# Patient Record
Sex: MALE | Race: WHITE | Employment: FULL TIME | URBAN - METROPOLITAN AREA
[De-identification: names, ages, dates, MRNs, and addresses within clinical notes are randomized per-mention and may not be internally consistent; named-entity substitution may affect disease eponyms.]

---

## 2022-10-29 PROCEDURE — 99283 EMERGENCY DEPT VISIT LOW MDM: CPT

## 2022-10-30 ENCOUNTER — HOSPITAL ENCOUNTER (EMERGENCY)
Age: 47
Discharge: HOME | End: 2022-10-30
Attending: EMERGENCY MEDICINE
Payer: COMMERCIAL

## 2022-10-30 VITALS
OXYGEN SATURATION: 99 % | BODY MASS INDEX: 31.14 KG/M2 | TEMPERATURE: 98 F | RESPIRATION RATE: 18 BRPM | DIASTOLIC BLOOD PRESSURE: 102 MMHG | WEIGHT: 235 LBS | SYSTOLIC BLOOD PRESSURE: 147 MMHG | HEIGHT: 73 IN | HEART RATE: 120 BPM

## 2022-10-30 DIAGNOSIS — K08.89 PAIN, DENTAL: Primary | ICD-10-CM

## 2022-10-30 RX ORDER — HYDROCODONE BITARTRATE AND ACETAMINOPHEN 5; 325 MG/1; MG/1
1 TABLET ORAL
Qty: 12 TABLET | Refills: 0 | Status: SHIPPED | OUTPATIENT
Start: 2022-10-30 | End: 2022-11-02

## 2022-10-30 RX ORDER — HYDROCODONE BITARTRATE AND ACETAMINOPHEN 5; 325 MG/1; MG/1
1 TABLET ORAL
Qty: 12 TABLET | Refills: 0 | Status: SHIPPED | OUTPATIENT
Start: 2022-10-30 | End: 2022-10-30 | Stop reason: SDUPTHER

## 2022-10-30 NOTE — DISCHARGE INSTRUCTIONS
DENTAL CLINICS FOR FOLLOW UP:    Dr. Heaven Solorzano, DDS   [address] [facility] [address]  [address]:  [address] [facility], [address] [facility]  [phone]  Artist Graves, and Extractions    [facility]  [address] [facility], [address] [place]  [phone]  Artist Graves, and Extractions    [facility]  [address] [facility] [address]  [phone]  Fillings, Cleaning, and [address] [facility] [place] [address] [place], [address] [facility]  [phone]    [facility]  [address] [facility], [address]  [phone]  Ages 3-18, if attending [facility]  [address] [facility], [address] [facility] [address]  [phone]  Extractions, Shahbaz Richardson, [facility]    [facility] [address], [address] [facility]  [phone]  Oral Surgery - $70 required  Dundee Hind, Extractions - $100 Required    [facility] [address] [facility] [address], [address]  [phone]  [facility] Only    [address] [facility], [address] [facility]  Dental Clinic Open September to June    Return to the ED for worsening symptoms or further concerns.

## 2022-10-30 NOTE — ED TRIAGE NOTES
Patient to ED ambulatory for reports of tooth pain and facial swelling despite [facility]